# Patient Record
Sex: MALE | Race: WHITE | NOT HISPANIC OR LATINO
[De-identification: names, ages, dates, MRNs, and addresses within clinical notes are randomized per-mention and may not be internally consistent; named-entity substitution may affect disease eponyms.]

---

## 2018-01-11 PROBLEM — Z00.00 ENCOUNTER FOR PREVENTIVE HEALTH EXAMINATION: Status: ACTIVE | Noted: 2018-01-11

## 2018-01-22 ENCOUNTER — APPOINTMENT (OUTPATIENT)
Dept: UROLOGY | Facility: CLINIC | Age: 53
End: 2018-01-22
Payer: COMMERCIAL

## 2018-01-22 VITALS — OXYGEN SATURATION: 93 % | WEIGHT: 238 LBS | HEART RATE: 88 BPM | BODY MASS INDEX: 33.32 KG/M2 | HEIGHT: 71 IN

## 2018-01-22 DIAGNOSIS — Z83.6 FAMILY HISTORY OF OTHER DISEASES OF THE RESPIRATORY SYSTEM: ICD-10-CM

## 2018-01-22 DIAGNOSIS — Z78.9 OTHER SPECIFIED HEALTH STATUS: ICD-10-CM

## 2018-01-22 DIAGNOSIS — B49 UNSPECIFIED MYCOSIS: ICD-10-CM

## 2018-01-22 DIAGNOSIS — Z83.3 FAMILY HISTORY OF DIABETES MELLITUS: ICD-10-CM

## 2018-01-22 DIAGNOSIS — Z87.19 PERSONAL HISTORY OF OTHER DISEASES OF THE DIGESTIVE SYSTEM: ICD-10-CM

## 2018-01-22 DIAGNOSIS — Z80.8 FAMILY HISTORY OF MALIGNANT NEOPLASM OF OTHER ORGANS OR SYSTEMS: ICD-10-CM

## 2018-01-22 DIAGNOSIS — K46.9 UNSPECIFIED ABDOMINAL HERNIA W/OUT OBSTRUCTION OR GANGRENE: ICD-10-CM

## 2018-01-22 DIAGNOSIS — D41.4 NEOPLASM OF UNCERTAIN BEHAVIOR OF BLADDER: ICD-10-CM

## 2018-01-22 DIAGNOSIS — Z82.49 FAMILY HISTORY OF ISCHEMIC HEART DISEASE AND OTHER DISEASES OF THE CIRCULATORY SYSTEM: ICD-10-CM

## 2018-01-22 PROCEDURE — 99203 OFFICE O/P NEW LOW 30 MIN: CPT

## 2018-01-23 LAB
APPEARANCE: CLEAR
BACTERIA: NEGATIVE
BILIRUBIN URINE: NEGATIVE
BLOOD URINE: NEGATIVE
COLOR: YELLOW
GLUCOSE QUALITATIVE U: NEGATIVE MG/DL
HYALINE CASTS: 1 /LPF
KETONES URINE: NEGATIVE
LEUKOCYTE ESTERASE URINE: NEGATIVE
MICROSCOPIC-UA: NORMAL
NITRITE URINE: NEGATIVE
PH URINE: 5
PROTEIN URINE: NEGATIVE MG/DL
RED BLOOD CELLS URINE: 6 /HPF
SPECIFIC GRAVITY URINE: 1.02
SQUAMOUS EPITHELIAL CELLS: 1 /HPF
UROBILINOGEN URINE: NEGATIVE MG/DL
WHITE BLOOD CELLS URINE: 2 /HPF

## 2018-01-24 LAB — BACTERIA UR CULT: NORMAL

## 2018-01-26 ENCOUNTER — OUTPATIENT (OUTPATIENT)
Dept: OUTPATIENT SERVICES | Facility: HOSPITAL | Age: 53
LOS: 1 days | End: 2018-01-26

## 2018-01-26 VITALS
SYSTOLIC BLOOD PRESSURE: 130 MMHG | TEMPERATURE: 98 F | DIASTOLIC BLOOD PRESSURE: 80 MMHG | RESPIRATION RATE: 16 BRPM | HEIGHT: 70.5 IN | HEART RATE: 86 BPM | WEIGHT: 240.08 LBS

## 2018-01-26 DIAGNOSIS — N32.9 BLADDER DISORDER, UNSPECIFIED: ICD-10-CM

## 2018-01-26 DIAGNOSIS — Z87.39 PERSONAL HISTORY OF OTHER DISEASES OF THE MUSCULOSKELETAL SYSTEM AND CONNECTIVE TISSUE: Chronic | ICD-10-CM

## 2018-01-26 DIAGNOSIS — T14.90XA INJURY, UNSPECIFIED, INITIAL ENCOUNTER: Chronic | ICD-10-CM

## 2018-01-26 DIAGNOSIS — D41.4 NEOPLASM OF UNCERTAIN BEHAVIOR OF BLADDER: ICD-10-CM

## 2018-01-26 DIAGNOSIS — S60.454A SUPERFICIAL FOREIGN BODY OF RIGHT RING FINGER, INITIAL ENCOUNTER: Chronic | ICD-10-CM

## 2018-01-26 LAB
ALBUMIN SERPL ELPH-MCNC: 4.1 G/DL — SIGNIFICANT CHANGE UP (ref 3.3–5)
ALP SERPL-CCNC: 62 U/L — SIGNIFICANT CHANGE UP (ref 40–120)
ALT FLD-CCNC: 50 U/L — HIGH (ref 4–41)
APPEARANCE UR: CLEAR — SIGNIFICANT CHANGE UP
AST SERPL-CCNC: 25 U/L — SIGNIFICANT CHANGE UP (ref 4–40)
BILIRUB DIRECT SERPL-MCNC: 0.1 MG/DL — SIGNIFICANT CHANGE UP (ref 0.1–0.2)
BILIRUB SERPL-MCNC: 0.4 MG/DL — SIGNIFICANT CHANGE UP (ref 0.2–1.2)
BILIRUB UR-MCNC: NEGATIVE — SIGNIFICANT CHANGE UP
BLOOD UR QL VISUAL: NEGATIVE — SIGNIFICANT CHANGE UP
BUN SERPL-MCNC: 19 MG/DL — SIGNIFICANT CHANGE UP (ref 7–23)
CALCIUM SERPL-MCNC: 9 MG/DL — SIGNIFICANT CHANGE UP (ref 8.4–10.5)
CHLORIDE SERPL-SCNC: 100 MMOL/L — SIGNIFICANT CHANGE UP (ref 98–107)
CO2 SERPL-SCNC: 25 MMOL/L — SIGNIFICANT CHANGE UP (ref 22–31)
COLOR SPEC: YELLOW — SIGNIFICANT CHANGE UP
CREAT SERPL-MCNC: 0.8 MG/DL — SIGNIFICANT CHANGE UP (ref 0.5–1.3)
GLUCOSE SERPL-MCNC: 107 MG/DL — HIGH (ref 70–99)
GLUCOSE UR-MCNC: NEGATIVE — SIGNIFICANT CHANGE UP
HBA1C BLD-MCNC: 6.2 % — HIGH (ref 4–5.6)
HCT VFR BLD CALC: 47.6 % — SIGNIFICANT CHANGE UP (ref 39–50)
HGB BLD-MCNC: 16.2 G/DL — SIGNIFICANT CHANGE UP (ref 13–17)
KETONES UR-MCNC: NEGATIVE — SIGNIFICANT CHANGE UP
LEUKOCYTE ESTERASE UR-ACNC: NEGATIVE — SIGNIFICANT CHANGE UP
MCHC RBC-ENTMCNC: 31.1 PG — SIGNIFICANT CHANGE UP (ref 27–34)
MCHC RBC-ENTMCNC: 34 % — SIGNIFICANT CHANGE UP (ref 32–36)
MCV RBC AUTO: 91.4 FL — SIGNIFICANT CHANGE UP (ref 80–100)
MUCOUS THREADS # UR AUTO: SIGNIFICANT CHANGE UP
NITRITE UR-MCNC: NEGATIVE — SIGNIFICANT CHANGE UP
NRBC # FLD: 0 — SIGNIFICANT CHANGE UP
PH UR: 6 — SIGNIFICANT CHANGE UP (ref 4.6–8)
PLATELET # BLD AUTO: 264 K/UL — SIGNIFICANT CHANGE UP (ref 150–400)
PMV BLD: 10.3 FL — SIGNIFICANT CHANGE UP (ref 7–13)
POTASSIUM SERPL-MCNC: 3.9 MMOL/L — SIGNIFICANT CHANGE UP (ref 3.5–5.3)
POTASSIUM SERPL-SCNC: 3.9 MMOL/L — SIGNIFICANT CHANGE UP (ref 3.5–5.3)
PROT SERPL-MCNC: 7.2 G/DL — SIGNIFICANT CHANGE UP (ref 6–8.3)
PROT UR-MCNC: NEGATIVE MG/DL — SIGNIFICANT CHANGE UP
RBC # BLD: 5.21 M/UL — SIGNIFICANT CHANGE UP (ref 4.2–5.8)
RBC # FLD: 12.7 % — SIGNIFICANT CHANGE UP (ref 10.3–14.5)
RBC CASTS # UR COMP ASSIST: SIGNIFICANT CHANGE UP (ref 0–?)
SODIUM SERPL-SCNC: 140 MMOL/L — SIGNIFICANT CHANGE UP (ref 135–145)
SP GR SPEC: 1.03 — SIGNIFICANT CHANGE UP (ref 1–1.04)
SQUAMOUS # UR AUTO: SIGNIFICANT CHANGE UP
UROBILINOGEN FLD QL: NORMAL MG/DL — SIGNIFICANT CHANGE UP
WBC # BLD: 8.77 K/UL — SIGNIFICANT CHANGE UP (ref 3.8–10.5)
WBC # FLD AUTO: 8.77 K/UL — SIGNIFICANT CHANGE UP (ref 3.8–10.5)
WBC UR QL: SIGNIFICANT CHANGE UP (ref 0–?)

## 2018-01-26 NOTE — H&P PST ADULT - PROBLEM SELECTOR PLAN 1
Pt. is scheduled for a cystoscopy, bladder biopsy 1/30/18.  Pt. has MARTE.... Scheduled pt. to be seen by Dr. Duenas today, 1/26/18 for cardiac clearance.  Pt. has hepatomegaly.  LFT's sent.  Discussed with Dr. Gilmore.  Notified OR booking of DALY precautions.

## 2018-01-26 NOTE — H&P PST ADULT - PMH
Bladder polyp    GERD (gastroesophageal reflux disease) Bladder polyp    Fatty liver  last CT scan at [facility] in [place] "a week ago"  GERD (gastroesophageal reflux disease) Bladder polyp    Fatty liver  last CT scan at [facility] in [place] "a week ago"  GERD (gastroesophageal reflux disease)    Obese    Umbilical hernia

## 2018-01-26 NOTE — H&P PST ADULT - PSH
Foreign body of right ring finger  excised-1992  History of bone disorder  left small toe, "they removed a bone that was inside the middle of the toe"-1993  Wound  "healed laceration that had to be cleaned out later by Surgeon", left thumb-2008

## 2018-01-26 NOTE — H&P PST ADULT - REASON FOR ADMISSION
"I have a polyp in my bladder, I was having urinary problems and infection...the urologist did the test where they scope you up the urethra and they saw a bladder polyp"

## 2018-01-26 NOTE — H&P PST ADULT - NSANTHOSAYNRD_GEN_A_CORE
No. DALY screening performed.  STOP BANG Legend: 0-2 = LOW Risk; 3-4 = INTERMEDIATE Risk; 5-8 = HIGH Risk

## 2018-01-26 NOTE — H&P PST ADULT - FAMILY HISTORY
Father  Still living? Yes, Estimated age: 81  Type 2 diabetes mellitus, Age at diagnosis: Age Unknown  Family history of Parkinson disease, Age at diagnosis: Age Unknown  Family history of skin cancer, Age at diagnosis: Age Unknown     Mother  Still living? Yes, Estimated age: 77  Family history of Raynaud's syndrome, Age at diagnosis: Age Unknown  Family history of Sjogren's disease, Age at diagnosis: Age Unknown  Family history of pulmonary fibrosis, Age at diagnosis: Age Unknown

## 2018-01-28 LAB
BACTERIA UR CULT: SIGNIFICANT CHANGE UP
SPECIMEN SOURCE: SIGNIFICANT CHANGE UP

## 2018-01-30 ENCOUNTER — APPOINTMENT (OUTPATIENT)
Dept: UROLOGY | Facility: AMBULATORY SURGERY CENTER | Age: 53
End: 2018-01-30

## 2018-01-30 ENCOUNTER — RESULT REVIEW (OUTPATIENT)
Age: 53
End: 2018-01-30

## 2018-01-30 ENCOUNTER — OUTPATIENT (OUTPATIENT)
Dept: OUTPATIENT SERVICES | Facility: HOSPITAL | Age: 53
LOS: 1 days | Discharge: HOME | End: 2018-01-30
Payer: COMMERCIAL

## 2018-01-30 ENCOUNTER — TRANSCRIPTION ENCOUNTER (OUTPATIENT)
Age: 53
End: 2018-01-30

## 2018-01-30 VITALS — HEART RATE: 65 BPM | OXYGEN SATURATION: 99 % | RESPIRATION RATE: 16 BRPM | TEMPERATURE: 99 F

## 2018-01-30 VITALS
HEIGHT: 70.5 IN | DIASTOLIC BLOOD PRESSURE: 90 MMHG | SYSTOLIC BLOOD PRESSURE: 122 MMHG | HEART RATE: 72 BPM | RESPIRATION RATE: 18 BRPM | WEIGHT: 240.08 LBS | OXYGEN SATURATION: 96 % | TEMPERATURE: 98 F

## 2018-01-30 DIAGNOSIS — S60.454A SUPERFICIAL FOREIGN BODY OF RIGHT RING FINGER, INITIAL ENCOUNTER: Chronic | ICD-10-CM

## 2018-01-30 DIAGNOSIS — T14.90XA INJURY, UNSPECIFIED, INITIAL ENCOUNTER: Chronic | ICD-10-CM

## 2018-01-30 DIAGNOSIS — N32.9 BLADDER DISORDER, UNSPECIFIED: ICD-10-CM

## 2018-01-30 DIAGNOSIS — Z87.39 PERSONAL HISTORY OF OTHER DISEASES OF THE MUSCULOSKELETAL SYSTEM AND CONNECTIVE TISSUE: Chronic | ICD-10-CM

## 2018-01-30 LAB — GLUCOSE BLDC GLUCOMTR-MCNC: 105 MG/DL — HIGH (ref 70–99)

## 2018-01-30 PROCEDURE — 88305 TISSUE EXAM BY PATHOLOGIST: CPT | Mod: 26

## 2018-01-30 PROCEDURE — 52234 CYSTOSCOPY AND TREATMENT: CPT

## 2018-01-30 NOTE — ASU DISCHARGE PLAN (ADULT/PEDIATRIC). - NOTIFY
Bleeding that does not stop/Inability to Tolerate Liquids or Foods/Unable to Urinate/Fever greater than 101/Pain not relieved by Medications/Persistent Nausea and Vomiting

## 2018-01-30 NOTE — BRIEF OPERATIVE NOTE - PROCEDURE
<<-----Click on this checkbox to enter Procedure Cystoscopy  01/30/2018  and bladder biopsy  Active  OROFEIM

## 2018-01-31 LAB — SURGICAL PATHOLOGY STUDY: SIGNIFICANT CHANGE UP

## 2018-02-07 ENCOUNTER — APPOINTMENT (OUTPATIENT)
Dept: UROLOGY | Facility: CLINIC | Age: 53
End: 2018-02-07
Payer: SELF-PAY

## 2018-02-07 PROCEDURE — 99213 OFFICE O/P EST LOW 20 MIN: CPT

## 2018-02-15 ENCOUNTER — MESSAGE (OUTPATIENT)
Age: 53
End: 2018-02-15

## 2018-07-23 PROBLEM — Z78.9 ALCOHOL USE: Status: ACTIVE | Noted: 2018-01-22

## 2019-07-19 PROBLEM — E66.9 OBESITY, UNSPECIFIED: Chronic | Status: ACTIVE | Noted: 2018-01-26

## 2019-07-19 PROBLEM — D41.4 NEOPLASM OF UNCERTAIN BEHAVIOR OF BLADDER: Chronic | Status: ACTIVE | Noted: 2018-01-26

## 2019-07-19 PROBLEM — K21.9 GASTRO-ESOPHAGEAL REFLUX DISEASE WITHOUT ESOPHAGITIS: Chronic | Status: ACTIVE | Noted: 2018-01-26

## 2019-07-19 PROBLEM — K42.9 UMBILICAL HERNIA WITHOUT OBSTRUCTION OR GANGRENE: Chronic | Status: ACTIVE | Noted: 2018-01-26

## 2019-07-19 PROBLEM — K76.0 FATTY (CHANGE OF) LIVER, NOT ELSEWHERE CLASSIFIED: Chronic | Status: ACTIVE | Noted: 2018-01-26

## 2019-07-22 ENCOUNTER — OUTPATIENT (OUTPATIENT)
Dept: OUTPATIENT SERVICES | Facility: HOSPITAL | Age: 54
LOS: 1 days | End: 2019-07-22
Payer: COMMERCIAL

## 2019-07-22 VITALS
SYSTOLIC BLOOD PRESSURE: 123 MMHG | TEMPERATURE: 98 F | HEIGHT: 70 IN | OXYGEN SATURATION: 96 % | RESPIRATION RATE: 17 BRPM | WEIGHT: 248.02 LBS | HEART RATE: 88 BPM | DIASTOLIC BLOOD PRESSURE: 82 MMHG

## 2019-07-22 DIAGNOSIS — Z87.39 PERSONAL HISTORY OF OTHER DISEASES OF THE MUSCULOSKELETAL SYSTEM AND CONNECTIVE TISSUE: Chronic | ICD-10-CM

## 2019-07-22 DIAGNOSIS — K40.00 BILATERAL INGUINAL HERNIA, WITH OBSTRUCTION, WITHOUT GANGRENE, NOT SPECIFIED AS RECURRENT: ICD-10-CM

## 2019-07-22 DIAGNOSIS — K40.20 BILATERAL INGUINAL HERNIA, WITHOUT OBSTRUCTION OR GANGRENE, NOT SPECIFIED AS RECURRENT: ICD-10-CM

## 2019-07-22 DIAGNOSIS — T14.90XA INJURY, UNSPECIFIED, INITIAL ENCOUNTER: Chronic | ICD-10-CM

## 2019-07-22 DIAGNOSIS — Z01.818 ENCOUNTER FOR OTHER PREPROCEDURAL EXAMINATION: ICD-10-CM

## 2019-07-22 DIAGNOSIS — S60.454A SUPERFICIAL FOREIGN BODY OF RIGHT RING FINGER, INITIAL ENCOUNTER: Chronic | ICD-10-CM

## 2019-07-22 DIAGNOSIS — R42 DIZZINESS AND GIDDINESS: ICD-10-CM

## 2019-07-22 LAB
HCT VFR BLD CALC: 45.4 % — SIGNIFICANT CHANGE UP (ref 39–50)
HGB BLD-MCNC: 15.6 G/DL — SIGNIFICANT CHANGE UP (ref 13–17)
MCHC RBC-ENTMCNC: 30.3 PG — SIGNIFICANT CHANGE UP (ref 27–34)
MCHC RBC-ENTMCNC: 34.4 GM/DL — SIGNIFICANT CHANGE UP (ref 32–36)
MCV RBC AUTO: 88.2 FL — SIGNIFICANT CHANGE UP (ref 80–100)
NRBC # BLD: 0 /100 WBCS — SIGNIFICANT CHANGE UP (ref 0–0)
PLATELET # BLD AUTO: 235 K/UL — SIGNIFICANT CHANGE UP (ref 150–400)
RBC # BLD: 5.15 M/UL — SIGNIFICANT CHANGE UP (ref 4.2–5.8)
RBC # FLD: 12.7 % — SIGNIFICANT CHANGE UP (ref 10.3–14.5)
WBC # BLD: 9.65 K/UL — SIGNIFICANT CHANGE UP (ref 3.8–10.5)
WBC # FLD AUTO: 9.65 K/UL — SIGNIFICANT CHANGE UP (ref 3.8–10.5)

## 2019-07-22 PROCEDURE — G0463: CPT

## 2019-07-22 PROCEDURE — 36415 COLL VENOUS BLD VENIPUNCTURE: CPT

## 2019-07-22 PROCEDURE — 85027 COMPLETE CBC AUTOMATED: CPT

## 2019-07-22 NOTE — H&P PST ADULT - NSICDXPASTSURGICALHX_GEN_ALL_CORE_FT
PAST SURGICAL HISTORY:  Foreign body of right ring finger excised-1992    History of bone disorder left small toe, "they removed a bone that was inside the middle of the toe"-1993    Wound "healed laceration that had to be cleaned out later by Surgeon", left thumb-2008

## 2019-07-22 NOTE — H&P PST ADULT - NEUROLOGICAL COMMENTS
Pt describes being "off balance at times when I look up". He denies LOC, slurred speech, weakness, memory changes.

## 2019-07-22 NOTE — H&P PST ADULT - ASSESSMENT
This 52 yo male with a PMHX of GERD, Vertigo  presents to PST for a scheduled Repair of bilateral inguinal hernia  with Dr Gaona on 7/26/19.

## 2019-07-22 NOTE — H&P PST ADULT - NSICDXPROBLEM_GEN_ALL_CORE_FT
PROBLEM DIAGNOSES  Problem: Bilateral inguinal hernia without obstruction or gangrene, recurrence not specified  Assessment and Plan: PST: CBC   No medical evaluation needed   All preoperative instructions including CHD cleanse reviewed with patient who verbalized understanding.   Avoid all NSAID/ASA containing products as well as all herbal/vitamin supplements. Tylenol only for pain/headache.     Problem: Dizziness  Assessment and Plan: Pt will call Dr. Ibarra regarding his recent complaints of dizziness. Tati at Dr. Gaona made aware and "will tell Dr. Gaona". She will follow up with patient as well.

## 2019-07-22 NOTE — H&P PST ADULT - NSICDXPASTMEDICALHX_GEN_ALL_CORE_FT
PAST MEDICAL HISTORY:  Bladder polyp     Fatty liver last CT scan at [facility] in [place] "a week ago"    GERD (gastroesophageal reflux disease)     Obese     Umbilical hernia

## 2019-07-22 NOTE — H&P PST ADULT - HISTORY OF PRESENT ILLNESS
He complaining of pain in his groin  for 4 months. He saw his PMD who did a CT which was positive for 4 hernias. He denies any abdominal pain, changes in bowels. He has has burning on urination at times. This 54 yo male with a PMHX of GERD, Vertigo  presents to PST for a scheduled Repair of bilateral inguinal hernia  with Dr Gaona on 7/26/19. He complaining of pain in his groin  for 4 months. He saw his PMD who did a CT which was positive for 4 hernias. He denies any abdominal pain, changes in bowels. He has has burning on urination at times.

## 2019-07-22 NOTE — H&P PST ADULT - NSICDXFAMILYHX_GEN_ALL_CORE_FT
FAMILY HISTORY:  Father  Still living? Yes, Estimated age: 81  Family history of Parkinson disease, Age at diagnosis: Age Unknown  Family history of skin cancer, Age at diagnosis: Age Unknown  Type 2 diabetes mellitus, Age at diagnosis: Age Unknown    Mother  Still living? Yes, Estimated age: 77  Family history of pulmonary fibrosis, Age at diagnosis: Age Unknown  Family history of Raynaud's syndrome, Age at diagnosis: Age Unknown  Family history of Sjogren's disease, Age at diagnosis: Age Unknown

## 2019-07-24 RX ORDER — SODIUM CHLORIDE 9 MG/ML
1000 INJECTION, SOLUTION INTRAVENOUS
Refills: 0 | Status: DISCONTINUED | OUTPATIENT
Start: 2019-07-26 | End: 2019-07-26

## 2019-07-25 ENCOUNTER — NON-APPOINTMENT (OUTPATIENT)
Age: 54
End: 2019-07-25

## 2019-07-25 ENCOUNTER — TRANSCRIPTION ENCOUNTER (OUTPATIENT)
Age: 54
End: 2019-07-25

## 2019-07-25 ENCOUNTER — APPOINTMENT (OUTPATIENT)
Dept: CARDIOLOGY | Facility: CLINIC | Age: 54
End: 2019-07-25
Payer: MEDICAID

## 2019-07-25 VITALS
DIASTOLIC BLOOD PRESSURE: 84 MMHG | OXYGEN SATURATION: 95 % | SYSTOLIC BLOOD PRESSURE: 129 MMHG | HEART RATE: 81 BPM | HEIGHT: 71 IN

## 2019-07-25 DIAGNOSIS — Z82.49 FAMILY HISTORY OF ISCHEMIC HEART DISEASE AND OTHER DISEASES OF THE CIRCULATORY SYSTEM: ICD-10-CM

## 2019-07-25 DIAGNOSIS — Z01.810 ENCOUNTER FOR PREPROCEDURAL CARDIOVASCULAR EXAMINATION: ICD-10-CM

## 2019-07-25 PROCEDURE — 99214 OFFICE O/P EST MOD 30 MIN: CPT

## 2019-07-25 PROCEDURE — 93000 ELECTROCARDIOGRAM COMPLETE: CPT

## 2019-07-25 PROCEDURE — 99244 OFF/OP CNSLTJ NEW/EST MOD 40: CPT

## 2019-07-25 RX ORDER — FLUTICASONE PROPIONATE 50 UG/1
50 SPRAY, METERED NASAL
Qty: 16 | Refills: 0 | Status: DISCONTINUED | COMMUNITY
Start: 2019-03-13

## 2019-07-25 RX ORDER — LEVOFLOXACIN 500 MG/1
500 TABLET, FILM COATED ORAL
Qty: 7 | Refills: 0 | Status: DISCONTINUED | COMMUNITY
Start: 2019-06-14

## 2019-07-25 RX ORDER — CETIRIZINE HYDROCHLORIDE 10 MG/1
10 TABLET, COATED ORAL
Qty: 30 | Refills: 0 | Status: DISCONTINUED | COMMUNITY
Start: 2019-03-13

## 2019-07-25 RX ORDER — TADALAFIL 20 MG/1
20 TABLET ORAL
Qty: 6 | Refills: 0 | Status: DISCONTINUED | COMMUNITY
Start: 2019-04-19

## 2019-07-25 RX ORDER — CLARITHROMYCIN 500 MG/1
500 TABLET, FILM COATED ORAL
Qty: 14 | Refills: 0 | Status: DISCONTINUED | COMMUNITY
Start: 2019-03-13

## 2019-07-26 ENCOUNTER — RESULT REVIEW (OUTPATIENT)
Age: 54
End: 2019-07-26

## 2019-07-26 ENCOUNTER — OUTPATIENT (OUTPATIENT)
Dept: OUTPATIENT SERVICES | Facility: HOSPITAL | Age: 54
LOS: 1 days | End: 2019-07-26
Payer: COMMERCIAL

## 2019-07-26 VITALS
HEIGHT: 70.6 IN | DIASTOLIC BLOOD PRESSURE: 78 MMHG | TEMPERATURE: 99 F | HEART RATE: 67 BPM | RESPIRATION RATE: 16 BRPM | OXYGEN SATURATION: 95 % | WEIGHT: 244.93 LBS | SYSTOLIC BLOOD PRESSURE: 130 MMHG

## 2019-07-26 VITALS — HEART RATE: 64 BPM | SYSTOLIC BLOOD PRESSURE: 123 MMHG | DIASTOLIC BLOOD PRESSURE: 73 MMHG | RESPIRATION RATE: 14 BRPM

## 2019-07-26 DIAGNOSIS — K40.00 BILATERAL INGUINAL HERNIA, WITH OBSTRUCTION, WITHOUT GANGRENE, NOT SPECIFIED AS RECURRENT: ICD-10-CM

## 2019-07-26 DIAGNOSIS — Z01.818 ENCOUNTER FOR OTHER PREPROCEDURAL EXAMINATION: ICD-10-CM

## 2019-07-26 DIAGNOSIS — S60.454A SUPERFICIAL FOREIGN BODY OF RIGHT RING FINGER, INITIAL ENCOUNTER: Chronic | ICD-10-CM

## 2019-07-26 DIAGNOSIS — Z87.39 PERSONAL HISTORY OF OTHER DISEASES OF THE MUSCULOSKELETAL SYSTEM AND CONNECTIVE TISSUE: Chronic | ICD-10-CM

## 2019-07-26 DIAGNOSIS — T14.90XA INJURY, UNSPECIFIED, INITIAL ENCOUNTER: Chronic | ICD-10-CM

## 2019-07-26 PROCEDURE — 88304 TISSUE EXAM BY PATHOLOGIST: CPT | Mod: 26

## 2019-07-26 PROCEDURE — 49505 PRP I/HERN INIT REDUC >5 YR: CPT | Mod: 50

## 2019-07-26 PROCEDURE — 88304 TISSUE EXAM BY PATHOLOGIST: CPT

## 2019-07-26 PROCEDURE — C1781: CPT

## 2019-07-26 RX ORDER — SODIUM CHLORIDE 9 MG/ML
1000 INJECTION, SOLUTION INTRAVENOUS
Refills: 0 | Status: DISCONTINUED | OUTPATIENT
Start: 2019-07-26 | End: 2019-07-26

## 2019-07-26 RX ORDER — MAGNESIUM HYDROXIDE 400 MG/1
0 TABLET, CHEWABLE ORAL
Qty: 0 | Refills: 0 | DISCHARGE

## 2019-07-26 RX ORDER — ACETAMINOPHEN 500 MG
2 TABLET ORAL
Qty: 0 | Refills: 0 | DISCHARGE

## 2019-07-26 RX ORDER — ACETAMINOPHEN 500 MG
1000 TABLET ORAL ONCE
Refills: 0 | Status: COMPLETED | OUTPATIENT
Start: 2019-07-26 | End: 2019-07-26

## 2019-07-26 RX ORDER — OMEPRAZOLE 10 MG/1
1 CAPSULE, DELAYED RELEASE ORAL
Qty: 0 | Refills: 0 | DISCHARGE

## 2019-07-26 RX ORDER — CEFAZOLIN SODIUM 1 G
1000 VIAL (EA) INJECTION ONCE
Refills: 0 | Status: COMPLETED | OUTPATIENT
Start: 2019-07-26 | End: 2019-07-26

## 2019-07-26 RX ORDER — HYDROMORPHONE HYDROCHLORIDE 2 MG/ML
0.5 INJECTION INTRAMUSCULAR; INTRAVENOUS; SUBCUTANEOUS
Refills: 0 | Status: DISCONTINUED | OUTPATIENT
Start: 2019-07-26 | End: 2019-07-26

## 2019-07-26 RX ORDER — OXYCODONE HYDROCHLORIDE 5 MG/1
5 TABLET ORAL ONCE
Refills: 0 | Status: DISCONTINUED | OUTPATIENT
Start: 2019-07-26 | End: 2019-07-26

## 2019-07-26 RX ORDER — ONDANSETRON 8 MG/1
4 TABLET, FILM COATED ORAL ONCE
Refills: 0 | Status: DISCONTINUED | OUTPATIENT
Start: 2019-07-26 | End: 2019-07-26

## 2019-07-26 RX ADMIN — SODIUM CHLORIDE 75 MILLILITER(S): 9 INJECTION, SOLUTION INTRAVENOUS at 13:03

## 2019-07-26 RX ADMIN — HYDROMORPHONE HYDROCHLORIDE 0.5 MILLIGRAM(S): 2 INJECTION INTRAMUSCULAR; INTRAVENOUS; SUBCUTANEOUS at 16:39

## 2019-07-26 RX ADMIN — HYDROMORPHONE HYDROCHLORIDE 0.5 MILLIGRAM(S): 2 INJECTION INTRAMUSCULAR; INTRAVENOUS; SUBCUTANEOUS at 16:20

## 2019-07-26 RX ADMIN — Medication 400 MILLIGRAM(S): at 16:52

## 2019-07-26 RX ADMIN — HYDROMORPHONE HYDROCHLORIDE 0.5 MILLIGRAM(S): 2 INJECTION INTRAMUSCULAR; INTRAVENOUS; SUBCUTANEOUS at 16:58

## 2019-07-26 RX ADMIN — SODIUM CHLORIDE 100 MILLILITER(S): 9 INJECTION, SOLUTION INTRAVENOUS at 16:22

## 2019-07-26 NOTE — ASU DISCHARGE PLAN (ADULT/PEDIATRIC) - CARE PROVIDER_API CALL
Byron Gaona ()  Surgery  [address] , [place] [address]  Phone: [phone]  Fax: [phone]  Follow Up Time:

## 2019-07-26 NOTE — ASU PATIENT PROFILE, ADULT - PMH
Bladder polyp    Fatty liver  last CT scan at [facility] in [place] "a week ago"  GERD (gastroesophageal reflux disease)    Obese    Umbilical hernia

## 2019-07-26 NOTE — ASU DISCHARGE PLAN (ADULT/PEDIATRIC) - ASU DC SPECIAL INSTRUCTIONSFT
Keep steri-strips clean, dry and intact x 24 hrs. Apply water proof ice pack 20 mins on, 20 mins off to help decrease pain and swelling. After 24 hrs, you may begin showering as usual but Do NOT remove steri-strips. Do not scrub or soak incision site. Pat dry. NO tub baths, NO swimming pools. No hot tubs.    Call Dr. Henny Gaona's office at [phone] for an appointment for follow up in 2 weeks.

## 2019-07-26 NOTE — ASU DISCHARGE PLAN (ADULT/PEDIATRIC) - ACTIVITY LEVEL
No heavy lifting/No excercise/No sports/gym No tub baths/No sports/gym/No heavy lifting/No excercise

## 2019-07-26 NOTE — BRIEF OPERATIVE NOTE - COMMENTS
Repair Bilateral Inguinal Hernias with Mesh, Excision Bilateral Spermatic Cord Lipomas, Bilateral Ilioinguinal Nerve Blocks

## 2019-07-30 LAB — SURGICAL PATHOLOGY STUDY: SIGNIFICANT CHANGE UP

## 2019-10-14 ENCOUNTER — APPOINTMENT (OUTPATIENT)
Dept: UROLOGY | Facility: CLINIC | Age: 54
End: 2019-10-14
Payer: MEDICAID

## 2019-10-14 VITALS
HEART RATE: 87 BPM | DIASTOLIC BLOOD PRESSURE: 70 MMHG | HEIGHT: 71 IN | WEIGHT: 255 LBS | SYSTOLIC BLOOD PRESSURE: 140 MMHG | OXYGEN SATURATION: 97 % | BODY MASS INDEX: 35.7 KG/M2

## 2019-10-14 PROCEDURE — 99213 OFFICE O/P EST LOW 20 MIN: CPT

## 2019-10-14 NOTE — PHYSICAL EXAM
[General Appearance - Well Nourished] : well nourished [General Appearance - Well Developed] : well developed [Normal Appearance] : normal appearance [Well Groomed] : well groomed [General Appearance - In No Acute Distress] : no acute distress [Abdomen Soft] : soft [Costovertebral Angle Tenderness] : no ~M costovertebral angle tenderness [Abdomen Tenderness] : non-tender [Urethral Meatus] : meatus normal [Penis Abnormality] : normal circumcised penis [Scrotum] : the scrotum was normal [Urinary Bladder Findings] : the bladder was normal on palpation [Testes Mass (___cm)] : there were no testicular masses [FreeTextEntry1] : Hernia repair incisions intact [] : no respiratory distress [Exaggerated Use Of Accessory Muscles For Inspiration] : no accessory muscle use [Respiration, Rhythm And Depth] : normal respiratory rhythm and effort

## 2019-10-14 NOTE — HISTORY OF PRESENT ILLNESS
[FreeTextEntry1] : He is a 53-year-old man who is seen today in follow-up. About 2 months ago, he underwent bilateral inguinal hernia repair. Now he has difficulty initiating urination and he has to urinate a few times to empty the bladder. Nocturia is 2 times. There is no gross hematuria. Residual urine today was 100 cc. He was given antibiotics which has improved his symptoms. He underwent resection of a bladder lesion in 2018. Pathology report showed urothelial papilloma. \par Previous note: He was found to have microscopic hematuria. Therefore, he underwent workup by his previous urologist. He is a nonsmoker. Patient states that he has been exposed to chemicals during his previous job. He underwent a CT scan in December 2017 which showed fatty infiltration of the liver. Also cystoscopy performed by Dr. Miranda showed small polyp on the left lateral wall of the bladder above the trigone with prominent blood vessels. Urine cytology was negative and he was told that PSA level was within the normal range.

## 2019-10-14 NOTE — ASSESSMENT
[FreeTextEntry1] : Urine culture will be sent. Residual urine is acceptable. He will start Flomax in order to improve symptoms. Also he wants to consider cystoscopy to rule out recurrence of any bladder lesions. He will be scheduled in the near future.

## 2019-10-15 LAB
APPEARANCE: CLEAR
BACTERIA: NEGATIVE
BILIRUBIN URINE: NEGATIVE
BLOOD URINE: NEGATIVE
COLOR: YELLOW
GLUCOSE QUALITATIVE U: NORMAL
HYALINE CASTS: 0 /LPF
KETONES URINE: NEGATIVE
LEUKOCYTE ESTERASE URINE: NEGATIVE
MICROSCOPIC-UA: NORMAL
NITRITE URINE: NEGATIVE
PH URINE: 6
PROTEIN URINE: NORMAL
RED BLOOD CELLS URINE: 2 /HPF
SPECIFIC GRAVITY URINE: 1.03
SQUAMOUS EPITHELIAL CELLS: 1 /HPF
URIC ACID CRYSTALS: ABNORMAL
UROBILINOGEN URINE: NORMAL
WHITE BLOOD CELLS URINE: 3 /HPF

## 2019-10-16 LAB
BACTERIA UR CULT: NORMAL
URINE CYTOLOGY: NORMAL

## 2019-11-11 ENCOUNTER — APPOINTMENT (OUTPATIENT)
Dept: UROLOGY | Facility: CLINIC | Age: 54
End: 2019-11-11
Payer: MEDICAID

## 2019-11-11 VITALS
OXYGEN SATURATION: 97 % | DIASTOLIC BLOOD PRESSURE: 74 MMHG | HEIGHT: 71 IN | HEART RATE: 90 BPM | RESPIRATION RATE: 13 BRPM | WEIGHT: 255 LBS | SYSTOLIC BLOOD PRESSURE: 120 MMHG | BODY MASS INDEX: 35.7 KG/M2

## 2019-11-11 PROCEDURE — 52000 CYSTOURETHROSCOPY: CPT

## 2019-12-23 ENCOUNTER — APPOINTMENT (OUTPATIENT)
Dept: ORTHOPEDIC SURGERY | Facility: CLINIC | Age: 54
End: 2019-12-23
Payer: MEDICAID

## 2019-12-23 VITALS
SYSTOLIC BLOOD PRESSURE: 135 MMHG | BODY MASS INDEX: 35.79 KG/M2 | WEIGHT: 250 LBS | HEIGHT: 70 IN | HEART RATE: 69 BPM | DIASTOLIC BLOOD PRESSURE: 87 MMHG

## 2019-12-23 DIAGNOSIS — M47.812 SPONDYLOSIS W/OUT MYELOPATHY OR RADICULOPATHY, CERVICAL REGION: ICD-10-CM

## 2019-12-23 DIAGNOSIS — G89.29 DORSALGIA, UNSPECIFIED: ICD-10-CM

## 2019-12-23 DIAGNOSIS — M54.2 CERVICALGIA: ICD-10-CM

## 2019-12-23 DIAGNOSIS — M75.02 ADHESIVE CAPSULITIS OF LEFT SHOULDER: ICD-10-CM

## 2019-12-23 DIAGNOSIS — M54.9 DORSALGIA, UNSPECIFIED: ICD-10-CM

## 2019-12-23 PROCEDURE — 99204 OFFICE O/P NEW MOD 45 MIN: CPT

## 2019-12-23 PROCEDURE — 73030 X-RAY EXAM OF SHOULDER: CPT | Mod: LT

## 2019-12-23 NOTE — REVIEW OF SYSTEMS
[Wheezing] : wheezing [Heartburn] : heartburn [Negative] : Cardiovascular [FreeTextEntry8] : Hematuria with a bladder polyp, benign

## 2019-12-23 NOTE — PHYSICAL EXAM
[de-identified] : He is fully alert and oriented with a normal mood and affect.  He is in no acute distress.  He ambulates with a normal gait including tiptoe and heel walking.  Overweight.  There are no cutaneous abnormalities or palpable bony defects of the spine.  There is no evidence of shortness of breath or respiratory distress.  Forward flexion of the spine is limited to 60 degrees by tight hamstrings.  His lower extremity neurological examination revealed 1-2+ symmetrical reflexes with downgoing toes.  Motor and sensory exam is normal and straight leg raising is negative to 90 degrees.  Vascular exam shows no evidence of varicosities and there is no lymphedema.  His knees have a full range of motion with normal stability.  There are no cutaneous abnormalities of the upper or the lower extremities.  His upper extremity neurological exam revealed 1+ symmetrical reflexes with an absent left biceps reflex.  Motor and sensory exam is normal.  Right shoulder range of motion is full and painless.  There is a restriction of range of motion of the left shoulder.  Forward flexion and abduction is limited to 140 degrees with pain at the extremes.  External rotation is to 90 and internal rotation to 60.  Range of motion of the cervical spine shows flexion with the chin reaching to within 2 fingerbreadths of the chest, extension of 75 degrees with rotation of 70 degrees bilaterally and tilt of 25 degrees bilaterally. [de-identified] : MRI of the cervical spine from [facility] reveals a disc bulge at see 5 6 without evidence of cord or nerve root compression.  2 views of the left shoulder shows no evidence of any inherent bony pathology.

## 2019-12-23 NOTE — DISCUSSION/SUMMARY
[Medication Risks Reviewed] : Medication risks reviewed [de-identified] : He has multiple daily habits that may aggravate and probably gait neck related symptoms.  We discussed the necessity to change those habits.  He will use moist heat and is been started on ibuprofen 800 mg 3 times a day as a nonsteroidal anti-inflammatory.  He now also has an adhesive capsulitis of the left shoulder likely related to his cervical spine problems.  He has been sent to physical therapy for assisted active and passive range of motion with stretching and modalities and a home exercise program.  I will see him for follow-up in 4 weeks.  He will call if there are problems with the medication.

## 2019-12-23 NOTE — REASON FOR VISIT
[Initial Visit] : an initial visit for [Back Pain] : back pain [Neck Pain] : neck pain [Radiculopathy] : radiculopathy

## 2019-12-23 NOTE — HISTORY OF PRESENT ILLNESS
[de-identified] : This 54-year-old  awoke with left-sided neck pain 6 months ago.  He was visiting a chiropractor and the symptoms became significantly worse at the chiropractor 2 months ago.  He has bilateral neck and upper back pain worse on the left side than the right at times with some radiation to the shoulders elbows and wrists of both upper extremities.  He denies associated neurologic symptoms of numbness, paresthesias or weakness.  The pain is worse with coughing and sneezing but no worse forcing to move his bowels.  He has had night pain.  He does have some daily habits that will aggravate and probably gait neck related symptoms.  He has reflux symptoms that are well controlled with a proton pump inhibitor.

## 2020-01-21 ENCOUNTER — RX RENEWAL (OUTPATIENT)
Age: 55
End: 2020-01-21

## 2020-05-27 ENCOUNTER — APPOINTMENT (OUTPATIENT)
Dept: SURGERY | Facility: CLINIC | Age: 55
End: 2020-05-27

## 2020-09-02 ENCOUNTER — APPOINTMENT (OUTPATIENT)
Dept: SURGERY | Facility: CLINIC | Age: 55
End: 2020-09-02
Payer: MEDICAID

## 2020-09-02 VITALS
BODY MASS INDEX: 35.07 KG/M2 | SYSTOLIC BLOOD PRESSURE: 144 MMHG | HEIGHT: 70 IN | HEART RATE: 73 BPM | WEIGHT: 245 LBS | OXYGEN SATURATION: 96 % | TEMPERATURE: 97.8 F | DIASTOLIC BLOOD PRESSURE: 92 MMHG

## 2020-09-02 DIAGNOSIS — R10.32 RIGHT LOWER QUADRANT PAIN: ICD-10-CM

## 2020-09-02 DIAGNOSIS — R10.31 RIGHT LOWER QUADRANT PAIN: ICD-10-CM

## 2020-09-02 PROCEDURE — 99203 OFFICE O/P NEW LOW 30 MIN: CPT

## 2020-09-03 NOTE — HISTORY OF PRESENT ILLNESS
[de-identified] : 53 yo male with h/o b/l open inguinal hernia repair 1 year ago presents today for evaluation of persistent b/l groin pain. He states he never felt right after his surgeries and was told by his PMD to see Dr. Treadwell. His pain is sometime so severe that he lays in bed in the fetal position. He also states he is unable to take long walks due to pain. He thinks he may have had a CT in the past to evaluate the pain.

## 2020-09-03 NOTE — ASSESSMENT
[FreeTextEntry1] : 53 yo male with chronic groin pain s/p open BIH repair. Will try to track down CT if done or will order one. Possible MRI if CT was done.

## 2020-09-03 NOTE — PHYSICAL EXAM
[Alert] : alert [Oriented to Place] : oriented to place [Oriented to Person] : oriented to person [Oriented to Time] : oriented to time [Calm] : calm [JVD] : no jugular venous distention  [de-identified] : SHONDA WILSON NAD [de-identified] : EOMI [de-identified] : soft NT ND, +obese, b/l well healed groin scars, no evidence of recurrence on exam. tenderness on L>R

## 2020-10-15 NOTE — H&P PST ADULT - SYMPTOMS
SOB with stair climbing, "occasional chest pain but it might be my reflux or stress" Tremfya Counseling: I discussed with the patient the risks of guselkumab including but not limited to immunosuppression, serious infections, worsening of inflammatory bowel disease and drug reactions.  The patient understands that monitoring is required including a PPD at baseline and must alert us or the primary physician if symptoms of infection or other concerning signs are noted.

## 2020-11-16 ENCOUNTER — APPOINTMENT (OUTPATIENT)
Dept: UROLOGY | Facility: CLINIC | Age: 55
End: 2020-11-16

## 2021-09-02 ENCOUNTER — APPOINTMENT (OUTPATIENT)
Dept: OTOLARYNGOLOGY | Facility: CLINIC | Age: 56
End: 2021-09-02
Payer: MEDICAID

## 2021-09-02 VITALS
SYSTOLIC BLOOD PRESSURE: 140 MMHG | BODY MASS INDEX: 35.07 KG/M2 | WEIGHT: 245 LBS | DIASTOLIC BLOOD PRESSURE: 82 MMHG | HEIGHT: 70 IN | HEART RATE: 72 BPM | TEMPERATURE: 98.1 F

## 2021-09-02 DIAGNOSIS — Z86.69 PERSONAL HISTORY OF OTHER DISEASES OF THE NERVOUS SYSTEM AND SENSE ORGANS: ICD-10-CM

## 2021-09-02 DIAGNOSIS — H90.3 SENSORINEURAL HEARING LOSS, BILATERAL: ICD-10-CM

## 2021-09-02 DIAGNOSIS — M54.12 RADICULOPATHY, CERVICAL REGION: ICD-10-CM

## 2021-09-02 DIAGNOSIS — G51.0 BELL'S PALSY: ICD-10-CM

## 2021-09-02 DIAGNOSIS — H61.23 IMPACTED CERUMEN, BILATERAL: ICD-10-CM

## 2021-09-02 PROCEDURE — 92567 TYMPANOMETRY: CPT

## 2021-09-02 PROCEDURE — 92557 COMPREHENSIVE HEARING TEST: CPT

## 2021-09-02 PROCEDURE — G0268 REMOVAL OF IMPACTED WAX MD: CPT

## 2021-09-02 PROCEDURE — 99203 OFFICE O/P NEW LOW 30 MIN: CPT | Mod: 25

## 2021-09-03 NOTE — HISTORY OF PRESENT ILLNESS
[No] : patient does not have a  history of radiation therapy [de-identified] : 56 yo male\par Patient complains if he lays on his right ear he cant hear anything out of his left ear x 6 months. He also states that his left ear has been draining x 1 year. States 6 months ago he had left sided facial paralysis.  He was evaluated by Neurology, was told that his brain shrunk and he should follow up with ENT. Pt has no ear pain, tinnitus, vertigo, nasal congestion, nasal discharge, epistaxis, sinus infections, facial pain, facial pressure, throat pain, dysphagia or fevers\par \par  [Hearing Loss] : hearing loss [Eustachian Tube Dysfunction] : no eustachian tube dysfunction [Cholesteatoma] : no cholesteatoma [Early Onset Hearing Loss] : no early onset hearing loss [Stroke] : no stroke [Allergic Rhinitis] : no allergic rhinitis [Adenoidectomy] : no adenoidectomy [Allergies] : no allergies [Asthma] : no asthma [Hyperthyroidism] : no hyperthyroidism [Sialadenitis] : no sialadenitis [Non-Hodgkin Lymphoma] : no non-hodgkin lymphoma [Hodgkin Disease] : no hodgkin disease [None] : No risk factors have been identified. [Graves Disease] : no graves disease [Thyroid Cancer] : no thyroid cancer

## 2021-09-03 NOTE — ASSESSMENT
[FreeTextEntry1] : Patient complains of left ear drainage x 1 year, hearing loss left ear x 6 months and left sided facial paralysis 6 months ago. \par \par Wax:\par -Cerumen is removed from the right and left  ear canal with a curette and suction.\par -Rx:Debrox be placed in both ears on a routine basis to keep them free of wax.\par -Routine debridement suggested to keep the ears free of wax.\par \par Facial Paralysis:\par -doing well now \par -continue care with Neurology \par MRI-no Masses\par \par Bilateral mild SNHL:\par -Rec-observe\par -Hearing Test performed to evaluate the extent of hearing loss and to explain pt's symptoms\par \par f/u prn and annual

## 2021-09-03 NOTE — DATA REVIEWED
[de-identified] : Hearing Test performed to evaluate the extent of hearing loss and  to explain pt's symptoms\par today's hearing test was personally reviewed and revealed\par Type A tymps and Hearing -1kHz, mild SNHL 2k-8kHz bilaterally.  [de-identified] : no masses

## 2021-09-03 NOTE — END OF VISIT
[FreeTextEntry3] : I personally saw and examined OLYA BEST in detail. I spoke to BINA Sandy regarding the assessment and plan of care. I reviewed the above assessment and plan of care, and agree. I have made changes in changes in the body of the note where appropriate.I personally reviewed the HPI, PMH, FH, SH, ROS and medications/allergies. I have spoken to BINA Sandy regarding the history and have personally determined the assessment and plan of care, and documented this myself. I was present and participated in all key portions of the encounter and all procedures noted above. I have made changes in the body of the note where appropriate.\par \par Attesting Faculty: See Attending Signature Below \par \par \par  [Time Spent: ___ minutes] : I have spent [unfilled] minutes of time on the encounter.

## 2021-12-08 ENCOUNTER — APPOINTMENT (OUTPATIENT)
Dept: UROLOGY | Facility: CLINIC | Age: 56
End: 2021-12-08
Payer: MEDICAID

## 2021-12-08 VITALS
SYSTOLIC BLOOD PRESSURE: 138 MMHG | DIASTOLIC BLOOD PRESSURE: 85 MMHG | HEIGHT: 70 IN | WEIGHT: 245 LBS | OXYGEN SATURATION: 94 % | RESPIRATION RATE: 14 BRPM | TEMPERATURE: 97.9 F | BODY MASS INDEX: 35.07 KG/M2 | HEART RATE: 96 BPM

## 2021-12-08 DIAGNOSIS — N32.9 BLADDER DISORDER, UNSPECIFIED: ICD-10-CM

## 2021-12-08 DIAGNOSIS — N40.1 BENIGN PROSTATIC HYPERPLASIA WITH LOWER URINARY TRACT SYMPMS: ICD-10-CM

## 2021-12-08 DIAGNOSIS — R35.1 BENIGN PROSTATIC HYPERPLASIA WITH LOWER URINARY TRACT SYMPMS: ICD-10-CM

## 2021-12-08 PROCEDURE — 99213 OFFICE O/P EST LOW 20 MIN: CPT

## 2021-12-08 RX ORDER — ATORVASTATIN CALCIUM 20 MG/1
20 TABLET, FILM COATED ORAL
Refills: 0 | Status: ACTIVE | COMMUNITY

## 2021-12-08 RX ORDER — IBUPROFEN 800 MG/1
800 TABLET, FILM COATED ORAL
Qty: 90 | Refills: 0 | Status: DISCONTINUED | COMMUNITY
Start: 2019-12-23 | End: 2021-12-08

## 2021-12-08 RX ORDER — TAMSULOSIN HYDROCHLORIDE 0.4 MG/1
0.4 CAPSULE ORAL
Qty: 90 | Refills: 2 | Status: ACTIVE | COMMUNITY
Start: 2019-10-14 | End: 1900-01-01

## 2021-12-08 RX ORDER — METFORMIN HYDROCHLORIDE 500 MG/1
500 TABLET, COATED ORAL
Refills: 0 | Status: ACTIVE | COMMUNITY

## 2021-12-08 RX ORDER — OXYCODONE AND ACETAMINOPHEN 5; 325 MG/1; MG/1
5-325 TABLET ORAL
Qty: 30 | Refills: 0 | Status: DISCONTINUED | COMMUNITY
Start: 2019-07-26 | End: 2021-12-08

## 2021-12-08 RX ORDER — COLCHICINE 0.6 MG/1
0.6 TABLET ORAL
Qty: 3 | Refills: 0 | Status: DISCONTINUED | COMMUNITY
Start: 2019-08-13 | End: 2021-12-08

## 2021-12-08 RX ORDER — SULFAMETHOXAZOLE AND TRIMETHOPRIM 800; 160 MG/1; MG/1
800-160 TABLET ORAL
Qty: 42 | Refills: 0 | Status: DISCONTINUED | COMMUNITY
Start: 2019-10-08 | End: 2021-12-08

## 2021-12-08 RX ORDER — OMEPRAZOLE 10 MG/1
10 CAPSULE, DELAYED RELEASE ORAL
Refills: 0 | Status: DISCONTINUED | COMMUNITY
End: 2021-12-08

## 2021-12-08 NOTE — ASSESSMENT
[FreeTextEntry1] : His laboratory values and CT scan results were reviewed with him.  PSA level was normal.  He will be started on tamsulosin again to see if urinary symptoms improve.  Side effects discussed.  Also in follow-up to bladder benign polyp and his urinary symptoms, he will cystoscopy again.

## 2021-12-08 NOTE — HISTORY OF PRESENT ILLNESS
[FreeTextEntry1] : He is a 56-year-old man who is seen today in follow-up.  He was last seen in 2019.  According to patient, he has chronic abdominal pain and also recently dysuria.  In November 2021, urinalysis and urine culture were negative twice and antibiotics did not help.  Results were seen on his mobile device.  In April 2021, PSA level was 1.04.  Also CT scan in April 2021 showed only enlarged prostate.  Residual urine today was about 100 cc.  His last cystoscopy was in November 2019.  He is supposed to see gastroenterology.  He has nocturia 3-4 times and urge incontinence.  He is no longer taking tamsulosin.\par \par He underwent resection of a bladder lesion in 2018. Pathology report showed urothelial papilloma. \par \par Previous note: He was found to have microscopic hematuria. Therefore, he underwent workup by his previous urologist. He is a nonsmoker. Patient states that he has been exposed to chemicals during his previous job. He underwent a CT scan in December 2017 which showed fatty infiltration of the liver. Also cystoscopy performed by Dr. Miranda showed small polyp on the left lateral wall of the bladder above the trigone with prominent blood vessels. Urine cytology was negative.

## 2021-12-08 NOTE — PHYSICAL EXAM
[General Appearance - Well Developed] : well developed [General Appearance - Well Nourished] : well nourished [Normal Appearance] : normal appearance [Well Groomed] : well groomed [General Appearance - In No Acute Distress] : no acute distress [Abdomen Soft] : soft [Abdomen Tenderness] : non-tender [Costovertebral Angle Tenderness] : no ~M costovertebral angle tenderness [Urethral Meatus] : meatus normal [Penis Abnormality] : normal circumcised penis [Urinary Bladder Findings] : the bladder was normal on palpation [Scrotum] : the scrotum was normal [Testes Mass (___cm)] : there were no testicular masses [] : no respiratory distress [Respiration, Rhythm And Depth] : normal respiratory rhythm and effort [Exaggerated Use Of Accessory Muscles For Inspiration] : no accessory muscle use [Oriented To Time, Place, And Person] : oriented to person, place, and time [Affect] : the affect was normal [Mood] : the mood was normal [Not Anxious] : not anxious

## 2021-12-08 NOTE — REVIEW OF SYSTEMS
[Abdominal Pain] : abdominal pain [Dysuria] : dysuria [Nocturia] : nocturia [Negative] : Respiratory

## 2022-01-05 ENCOUNTER — APPOINTMENT (OUTPATIENT)
Dept: UROLOGY | Facility: CLINIC | Age: 57
End: 2022-01-05

## 2025-07-18 NOTE — ASU DISCHARGE PLAN (ADULT/PEDIATRIC) - SHOWER ONLY DURATION DAY(S)
150mg Depo-Provera given in patient's Right Ventrogluteal. Patient supplied medication. Tolerated injection well and advised to schedule next injection in 3 months.   After 24 hrs